# Patient Record
Sex: MALE | Race: WHITE
[De-identification: names, ages, dates, MRNs, and addresses within clinical notes are randomized per-mention and may not be internally consistent; named-entity substitution may affect disease eponyms.]

---

## 2024-05-14 ENCOUNTER — APPOINTMENT (OUTPATIENT)
Dept: ORTHOPEDIC SURGERY | Facility: CLINIC | Age: 19
End: 2024-05-14
Payer: COMMERCIAL

## 2024-05-14 VITALS — BODY MASS INDEX: 24.38 KG/M2 | HEIGHT: 72 IN | RESPIRATION RATE: 16 BRPM | WEIGHT: 180 LBS

## 2024-05-14 DIAGNOSIS — S52.352D: ICD-10-CM

## 2024-05-14 PROCEDURE — 73090 X-RAY EXAM OF FOREARM: CPT | Mod: LT

## 2024-05-14 PROCEDURE — 99204 OFFICE O/P NEW MOD 45 MIN: CPT

## 2024-05-14 NOTE — HISTORY OF PRESENT ILLNESS
[Right] : right hand dominant [FreeTextEntry1] : Patient presents with left wrist discomfort for the past few years.  Patient was here on 12/19/2016 for a follow-up visit from his ORIF left radius shaft fracture DOS- 7/9/2015 status post 9 years.  Patient successfully healed his wrist fracture.  The plate was migrated proximally as he was growing and he developed a stress riser.  A discussion was made with implications of that and the risk for fracture above or below the plate.  Consideration for having elective surgery for plate removal at some point was also discussed. The plate was a Synthes 2.7 LCP plate from modular mini fragment set titanium.

## 2024-05-14 NOTE — CONSULT LETTER
[Dear  ___] : Dear  [unfilled], [Consult Letter:] : I had the pleasure of evaluating your patient, [unfilled]. [Please see my note below.] : Please see my note below. [Consult Closing:] : Thank you very much for allowing me to participate in the care of this patient.  If you have any questions, please do not hesitate to contact me. [Sincerely,] : Sincerely, [FreeTextEntry3] : Arturo Yi MD Co-Director [facility]

## 2024-05-14 NOTE — PHYSICAL EXAM
[de-identified] : On exam he has full wrist and digital range of motion and is neurovascular intact.  No warmth or erythema.  There is some tenderness at the distal aspect of the plate.  No pain with resisted thumb or index finger flexion.  Full pronation full supination [de-identified] : PA lateral oblique x-rays taken today demonstrate a completely healed radial shaft fracture with some mild apex volar angulation.  Hardware in good position without any hardware complications

## 2024-05-14 NOTE — ASSESSMENT
[FreeTextEntry1] : Patient with persistent pain in the volar forearm at the edge of the plate.  We discussed options including observation or surgical removal.  Risks benefits and alternatives of each discussed.  He would like to undergo a left radial shaft hardware removal (Synthes 2.7 LCP plate from modular mini fragment set titanium)  Risk benefits and alternatives discussed including, but not limited to refracture, inability to remove all of the hardware, infection nerve injury etc.  They would like to proceed with hardware removal